# Patient Record
Sex: MALE | Race: WHITE | NOT HISPANIC OR LATINO | Employment: OTHER | URBAN - METROPOLITAN AREA
[De-identification: names, ages, dates, MRNs, and addresses within clinical notes are randomized per-mention and may not be internally consistent; named-entity substitution may affect disease eponyms.]

---

## 2017-02-15 ENCOUNTER — OFFICE VISIT (OUTPATIENT)
Dept: URGENT CARE | Facility: CLINIC | Age: 27
End: 2017-02-15

## 2017-02-15 PROCEDURE — 86580 TB INTRADERMAL TEST: CPT

## 2018-06-12 ENCOUNTER — OFFICE VISIT (OUTPATIENT)
Dept: URGENT CARE | Facility: CLINIC | Age: 28
End: 2018-06-12
Payer: COMMERCIAL

## 2018-06-12 VITALS
OXYGEN SATURATION: 100 % | TEMPERATURE: 97.5 F | RESPIRATION RATE: 18 BRPM | SYSTOLIC BLOOD PRESSURE: 104 MMHG | DIASTOLIC BLOOD PRESSURE: 78 MMHG | HEART RATE: 67 BPM

## 2018-06-12 DIAGNOSIS — J06.9 ACUTE URI: Primary | ICD-10-CM

## 2018-06-12 PROCEDURE — S9088 SERVICES PROVIDED IN URGENT: HCPCS | Performed by: PHYSICIAN ASSISTANT

## 2018-06-12 PROCEDURE — 99203 OFFICE O/P NEW LOW 30 MIN: CPT | Performed by: PHYSICIAN ASSISTANT

## 2018-06-12 NOTE — LETTER
June 12, 2018     Patient: Tracie Arechiga   YOB: 1990   Date of Visit: 6/12/2018       To Whom It May Concern: It is my medical opinion that Tracie Arechiga should not return to work until 6/14/18  If you have any questions or concerns, please don't hesitate to call           Sincerely,        Toshia Kendrick PA-C    CC: No Recipients

## 2018-06-12 NOTE — PROGRESS NOTES
[facility]    NAME: Tracie Arechiga is a 33 y o  male  : 1990    MRN: [id number]  DATE: 2018  TIME: 11:34 AM    Assessment and Plan   Acute URI [J06 9]  1  Acute URI         Patient Instructions     Patient Instructions   Infection appears viral   Recommend symptomatic treatment  Can take ibuprofen or tylenol as needed for pain or fever  Over the counter cough and cold medications to help with symptoms  Use salt water gargles for sore throat and throat lozenges  Cough drops as needed  Wash hands frequently to prevent the spread of infection  If not improving over the next 7-10 days, follow up with PCP  Symptoms may persist for 10-14 days  Chief Complaint     Chief Complaint   Patient presents with    Sinusitis    Cold Like Symptoms       History of Present Illness   50-year-old male here with complaint of cold symptoms and sinus pressure for the last day  Patient states he just was not feeling well and he was sent home from work  His work make some get a work note so he is here to be seen  He is taking over-the-counter antihistamines which are helping a little bit  Review of Systems   Review of Systems   Constitutional: Negative for activity change, appetite change, chills, diaphoresis, fatigue, fever and unexpected weight change  HENT: Positive for congestion, sinus pressure and sore throat  Negative for ear pain, hearing loss, sneezing and tinnitus  Eyes: Negative for photophobia, redness and visual disturbance  Respiratory: Positive for cough  Negative for apnea, chest tightness, shortness of breath, wheezing and stridor  Cardiovascular: Negative for chest pain, palpitations and leg swelling  Gastrointestinal: Negative for abdominal distention, abdominal pain, blood in stool, constipation, diarrhea, nausea and vomiting  Endocrine: Negative for cold intolerance, heat intolerance, polydipsia, polyphagia and polyuria     Genitourinary: Negative for difficulty urinating, dysuria, flank pain, hematuria and urgency  Musculoskeletal: Negative for arthralgias, back pain, gait problem, myalgias, neck pain and neck stiffness  Skin: Negative for rash and wound  Neurological: Negative for dizziness, tremors, seizures, syncope, weakness, light-headedness, numbness and headaches  Hematological: Negative for adenopathy  Does not bruise/bleed easily  Psychiatric/Behavioral: Negative for agitation, behavioral problems, confusion and decreased concentration  The patient is not nervous/anxious  All other systems reviewed and are negative  Current Medications   No current outpatient prescriptions on file  Current Allergies     Allergies as of 06/12/2018    (No Known Allergies)          The following portions of the patient's history were reviewed and updated as appropriate: allergies, current medications, past family history, past medical history, past social history, past surgical history and problem list    History reviewed  No pertinent past medical history  No past surgical history on file  No family history on file  Medications have been verified  Objective   /78   Pulse 67   Temp 97 5 °F (36 4 °C)   Resp 18   SpO2 100%      Physical Exam   Physical Exam   Constitutional: He appears well-developed and well-nourished  No distress  HENT:   Head: Normocephalic  Right Ear: Tympanic membrane and external ear normal    Left Ear: Tympanic membrane and external ear normal    Nose: Mucosal edema present  Mouth/Throat: Oropharynx is clear and moist  No oropharyngeal exudate  Neck: Normal range of motion  Neck supple  Cardiovascular: Normal rate, regular rhythm and normal heart sounds  No murmur heard  Pulmonary/Chest: Effort normal and breath sounds normal  No respiratory distress  He has no wheezes  He has no rales  Abdominal: Soft  Bowel sounds are normal  There is no tenderness  Musculoskeletal: Normal range of motion  Lymphadenopathy:     He has no cervical adenopathy  Skin: Skin is warm  No rash noted  Vitals reviewed

## 2018-08-16 ENCOUNTER — OFFICE VISIT (OUTPATIENT)
Dept: URGENT CARE | Facility: CLINIC | Age: 28
End: 2018-08-16
Payer: COMMERCIAL

## 2018-08-16 ENCOUNTER — APPOINTMENT (EMERGENCY)
Dept: RADIOLOGY | Facility: HOSPITAL | Age: 28
End: 2018-08-16
Payer: COMMERCIAL

## 2018-08-16 ENCOUNTER — HOSPITAL ENCOUNTER (EMERGENCY)
Facility: HOSPITAL | Age: 28
Discharge: HOME | End: 2018-08-16
Attending: EMERGENCY MEDICINE
Payer: COMMERCIAL

## 2018-08-16 VITALS
RESPIRATION RATE: 22 BRPM | DIASTOLIC BLOOD PRESSURE: 84 MMHG | HEART RATE: 108 BPM | OXYGEN SATURATION: 100 % | SYSTOLIC BLOOD PRESSURE: 153 MMHG | TEMPERATURE: 98.1 F

## 2018-08-16 VITALS
HEART RATE: 80 BPM | SYSTOLIC BLOOD PRESSURE: 167 MMHG | RESPIRATION RATE: 43 BRPM | OXYGEN SATURATION: 95 % | WEIGHT: 125 LBS | BODY MASS INDEX: 18.94 KG/M2 | DIASTOLIC BLOOD PRESSURE: 89 MMHG | HEIGHT: 68 IN

## 2018-08-16 DIAGNOSIS — R06.4 HYPERVENTILATION: ICD-10-CM

## 2018-08-16 DIAGNOSIS — F41.9 ANXIETY: Primary | ICD-10-CM

## 2018-08-16 DIAGNOSIS — R07.9 CHEST PAIN, UNSPECIFIED TYPE: Primary | ICD-10-CM

## 2018-08-16 DIAGNOSIS — R00.0 SINUS TACHYCARDIA: ICD-10-CM

## 2018-08-16 PROCEDURE — 93005 ELECTROCARDIOGRAM TRACING: CPT | Performed by: PHYSICIAN ASSISTANT

## 2018-08-16 PROCEDURE — 99285 EMERGENCY DEPT VISIT HI MDM: CPT

## 2018-08-16 PROCEDURE — 99213 OFFICE O/P EST LOW 20 MIN: CPT | Performed by: PHYSICIAN ASSISTANT

## 2018-08-16 PROCEDURE — 71046 X-RAY EXAM CHEST 2 VIEWS: CPT

## 2018-08-16 RX ORDER — LORAZEPAM 2 MG/ML
1 INJECTION INTRAMUSCULAR ONCE
Status: DISCONTINUED | OUTPATIENT
Start: 2018-08-16 | End: 2018-08-16 | Stop reason: HOSPADM

## 2018-08-16 NOTE — ED PROVIDER NOTES
History  Chief Complaint   Patient presents with    Chest Pain     Patient has a hx of anxiety, to day go into a verbal argument with his employer and started having substurnal chest hevieness, then sharp into his left chest       33 yr male with c/o onset shortness of breath and hyperventilation similar to his anxiety attacks in the past after an argument with his co-worker  No chest pain, nausea vomiting or diaphoresis  Patient states he had paresthesias in all extremities  No abdominal or back pain  Patient states that he has had anxiety since he was a child  No depression or suicidal ideations  Patient does not want to see crisis  Patient states he started to feel better now  History provided by:  Patient  Chest Pain   Associated symptoms: numbness and shortness of breath    Associated symptoms: no abdominal pain, no back pain, no cough, no fever, no nausea and not vomiting        None       Past Medical History:   Diagnosis Date    Psychiatric disorder     anxiety        History reviewed  No pertinent surgical history  History reviewed  No pertinent family history  I have reviewed and agree with the history as documented  Social History   Substance Use Topics    Smoking status: Never Smoker    Smokeless tobacco: Never Used    Alcohol use Yes      Comment: socialy        Review of Systems   Constitutional: Negative for fever  Respiratory: Positive for shortness of breath  Negative for cough  Cardiovascular: Positive for chest pain  Negative for leg swelling  Gastrointestinal: Negative for abdominal pain, nausea and vomiting  Musculoskeletal: Negative for back pain  Neurological: Positive for numbness  Psychiatric/Behavioral: Negative for suicidal ideas  Anxiety       Physical Exam  Physical Exam   Constitutional: He is oriented to person, place, and time  He appears well-developed and well-nourished     Anxious   HENT:   Nose: Nose normal    Mouth/Throat: Oropharynx is clear and moist  No oropharyngeal exudate  Eyes: Conjunctivae are normal  Pupils are equal, round, and reactive to light  No scleral icterus  Neck: Normal range of motion  Neck supple  No JVD present  No tracheal deviation present  Cardiovascular: Regular rhythm and normal heart sounds  No murmur heard  Tachycardic   Pulmonary/Chest: Effort normal and breath sounds normal  No respiratory distress  He has no wheezes  He has no rales  Abdominal: Soft  Bowel sounds are normal  There is no tenderness  There is no guarding  Musculoskeletal: Normal range of motion  He exhibits no edema or tenderness  No calf tenderness   Neurological: He is alert and oriented to person, place, and time  No cranial nerve deficit or sensory deficit  He exhibits normal muscle tone  5/5 motor, nl sens   Skin: Skin is warm and dry  Psychiatric: His behavior is normal    Anxious   Nursing note and vitals reviewed  Vital Signs  ED Triage Vitals [08/16/18 1338]   Temp Pulse Respirations Blood Pressure SpO2   -- (!) 123 (!) 24 167/89 100 %      Temp Source Heart Rate Source Patient Position - Orthostatic VS BP Location FiO2 (%)   Temporal Monitor Sitting Left arm --      Pain Score       No Pain           Vitals:    08/16/18 1338 08/16/18 1345 08/16/18 1724   BP: 167/89 167/89    Pulse: (!) 123 (!) 121 80   Patient Position - Orthostatic VS: Sitting         Visual Acuity      ED Medications  Medications   LORazepam (ATIVAN) 2 mg/mL injection 1 mg (not administered)       Diagnostic Studies  Results Reviewed     None                 XR chest 2 views   Final Result by Tatiana Hanson (08/16 1530)   No acute cardiopulmonary disease             Signed by Tatiana Hanson MD                 Procedures  Procedures       Phone Contacts  ED Phone Contact    ED Course  ED Course as of Aug 16 1934   Thu Aug 16, 2018   1635 Pt remains tachycardic - declined ativan   HR 80 at discharge - feels much better MDM  CritCare Time    Disposition  Final diagnoses:   Anxiety   Hyperventilation     Time reflects when diagnosis was documented in both MDM as applicable and the Disposition within this note     Time User Action Codes Description Comment    8/16/2018  5:31 PM Saloni Rodriguez Add [F41 9] Anxiety     8/16/2018  5:32 PM Anisha CARRINGTON Add [R06 4] Hyperventilation       ED Disposition     ED Disposition Condition Comment    Discharge  [place] discharge to home/self care  Condition at discharge: Stable        Follow-up Information     Follow up With Specialties Details Why Contact Info    Clarisa Duron DO Family Medicine Schedule an appointment as soon as possible for a visit in 1 day Return if you worsen or any new problems occur  [address] [facility]  [address]            There are no discharge medications for this patient  No discharge procedures on file      ED Provider  Electronically Signed by           Leanne Perez MD  08/16/18 [address] [facility] Timbo Doyle MD  08/16/18 0709

## 2018-08-16 NOTE — PROGRESS NOTES
[facility]        NAME: Margy Coleman is a 33 y o  male  : 1990    MRN: [id number]  DATE: 2018  TIME: 1:16 PM    Assessment and Plan   Chest pain, unspecified type [R07 9]  1  Chest pain, unspecified type  Transfer to other facility   2  Sinus tachycardia  Transfer to other facility         Patient Instructions       Follow up with PCP in 3-5 days  Proceed to  ER if symptoms worsen  Chief Complaint     Chief Complaint   Patient presents with    Chest Pain         History of Present Illness       Patient presents with substernal chest pain which started approximately an hour and half ago after complication at work  He has known anxiety and felt himself becoming anxious  ENT coming lightheaded feels like he was going to pass out walked into our facility with the same symptoms  He denies any radiation of the chest pain  Review of Systems   Review of Systems   Respiratory: Positive for shortness of breath  Cardiovascular: Positive for chest pain  Neurological: Positive for light-headedness  Hematological: Negative for adenopathy  Current Medications     No current outpatient prescriptions on file  Current Allergies     Allergies as of 2018    (No Known Allergies)            The following portions of the patient's history were reviewed and updated as appropriate: allergies, current medications, past family history, past medical history, past social history, past surgical history and problem list      History reviewed  No pertinent past medical history  History reviewed  No pertinent surgical history  No family history on file  Medications have been verified  Objective   /84   Pulse (!) 108   Temp 98 1 °F (36 7 °C)   Resp 22   SpO2 100%        Physical Exam     Physical Exam   Constitutional: He is oriented to person, place, and time  Patient appears anxious with tachypnea  HENT:   Head: Normocephalic and atraumatic  Mouth/Throat: Oropharynx is clear and moist    Eyes: Conjunctivae are normal    Neck: Neck supple  No tracheal deviation present  Cardiovascular: Regular rhythm and normal heart sounds  Tachycardia present  Pulmonary/Chest: Breath sounds normal  Tachypnea noted  Musculoskeletal: Normal range of motion  Neurological: He is alert and oriented to person, place, and time  Skin: Skin is warm and dry  No rash noted  Psychiatric: He has a normal mood and affect  His behavior is normal  Judgment and thought content normal    Nursing note and vitals reviewed  EKG revealed sinus tachycardia  Initial vitals vital signs on presentation was a heart rate of 160 bpm pulse oximetry of 97% on room air; blood pressure 160/108  EMS was called and patient was transported to the emergency room in stable condition  Vital size improved however patient still complained of chest pain as anxiety was resolving

## 2018-08-16 NOTE — DISCHARGE INSTRUCTIONS
Anxiety   WHAT YOU NEED TO KNOW:   Anxiety is a condition that causes you to feel extremely worried or nervous  The feelings are so strong that they can cause problems with your daily activities or sleep  Anxiety may be triggered by something you fear, or it may happen without a cause  Family or work stress, smoking, caffeine, and alcohol can increase your risk for anxiety  Certain medicines or health conditions can also increase your risk  Anxiety can become a long-term condition if it is not managed or treated  DISCHARGE INSTRUCTIONS:   Call 911 if:   · You have chest pain, tightness, or heaviness that may spread to your shoulders, arms, jaw, neck, or back  · You feel like hurting yourself or someone else  Contact your healthcare provider if:   · Your symptoms get worse or do not get better with treatment  · Your anxiety keeps you from doing your regular daily activities  · You have new symptoms since your last visit  · You have questions or concerns about your condition or care  Medicines:   · Medicines  may be given to help you feel more calm and relaxed, and decrease your symptoms  · Take your medicine as directed  Contact your healthcare provider if you think your medicine is not helping or if you have side effects  Tell him of her if you are allergic to any medicine  Keep a list of the medicines, vitamins, and herbs you take  Include the amounts, and when and why you take them  Bring the list or the pill bottles to follow-up visits  Carry your medicine list with you in case of an emergency  Follow up with your healthcare provider within 2 weeks or as directed:  Write down your questions so you remember to ask them during your visits  Manage anxiety:   · Talk to someone about your anxiety  Your healthcare provider may suggest counseling  Cognitive behavioral therapy can help you understand and change how you react to events that trigger your symptoms   You might feel more comfortable talking with a friend or family member about your anxiety  Choose someone you know will be supportive and encouraging  · Find ways to relax  Activities such as exercise, meditation, or listening to music can help you relax  Spend time with friends, or do things you enjoy  · Practice deep breathing  Deep breathing can help you relax when you feel anxious  Focus on taking slow, deep breaths several times a day, or during an anxiety attack  Breathe in through your nose and out through your mouth  · Create a regular sleep routine  Regular sleep can help you feel calmer during the day  Go to sleep and wake up at the same times every day  Do not watch television or use the computer right before bed  Your room should be comfortable, dark, and quiet  · Eat a variety of healthy foods  Healthy foods include fruits, vegetables, low-fat dairy products, lean meats, fish, whole-grain breads, and cooked beans  Healthy foods can help you feel less anxious and have more energy  · Exercise regularly  Exercise can increase your energy level  Exercise may also lift your mood and help you sleep better  Your healthcare provider can help you create an exercise plan  · Do not smoke  Nicotine and other chemicals in cigarettes and cigars can increase anxiety  Ask your healthcare provider for information if you currently smoke and need help to quit  E-cigarettes or smokeless tobacco still contain nicotine  Talk to your healthcare provider before you use these products  · Do not have caffeine  Caffeine can make your symptoms worse  Do not have foods or drinks that are meant to increase your energy level  · Limit or do not drink alcohol  Ask your healthcare provider if alcohol is safe for you  You may not be able to drink alcohol if you take certain anxiety or depression medicines  Limit alcohol to 1 drink per day if you are a woman  Limit alcohol to 2 drinks per day if you are a man   A drink of alcohol is 12 ounces of beer, 5 ounces of wine, or 1½ ounces of liquor  · Do not use drugs  Drugs can make your anxiety worse  It can also make anxiety hard to manage  Talk to your healthcare provider if you use drugs and want help to quit  © 2017 [employer] Information is for End User's use only and may not be sold, redistributed or otherwise used for commercial purposes  All illustrations and images included in CareNotes® are the copyrighted property of [employer]  or [employer]  The above information is an  only  It is not intended as medical advice for individual conditions or treatments  Talk to your doctor, nurse or pharmacist before following any medical regimen to see if it is safe and effective for you  Hyperventilation   WHAT YOU NEED TO KNOW:   Hyperventilation is when your breathing is faster than normal  This fast breathing may cause you to also have other symptoms  Hyperventilation may be caused by anxiety, stress, or panic  Other causes include medicines, imbalances in the chemicals in your body, and too much caffeine  DISCHARGE INSTRUCTIONS:   Medicines:   · Antianxiety medicine: This medicine may be given to decrease anxiety and help you feel calm and relaxed  · Take your medicine as directed  Contact your healthcare provider if you think your medicine is not helping or if you have side effects  Tell him of her if you are allergic to any medicine  Keep a list of the medicines, vitamins, and herbs you take  Include the amounts, and when and why you take them  Bring the list or the pill bottles to follow-up visits  Carry your medicine list with you in case of an emergency  Follow up with your healthcare provider as directed:  Write down your questions so you remember to ask them during your visits  Manage hyperventilation:   · Stress management:  Learn about the stressors that cause you to hyperventilate   Find other ways to manage your response to stress  Deep breathing, relaxing muscles, or meditation may help you  Do not use the home remedy of breathing into a paper bag  This can be very dangerous, because it may cause a lack of oxygen  · Counseling: You may need help to manage the stress or anxiety that is causing your hyperventilation  Your healthcare provider may suggest that you see a counselor to talk about how you are feeling  Contact your healthcare provider if:   · Your symptoms do not get better, or they get worse  · You have questions or concerns about your condition or care  Return to the emergency department if:   · You have a seizure  · You feel like you are going to faint  · You have chest pain  © 2017 [facility] Information is for End User's use only and may not be sold, redistributed or otherwise used for commercial purposes  All illustrations and images included in CareNotes® are the copyrighted property of [employer]  or [employer]  The above information is an  only  It is not intended as medical advice for individual conditions or treatments  Talk to your doctor, nurse or pharmacist before following any medical regimen to see if it is safe and effective for you

## 2018-08-17 LAB
ATRIAL RATE: 100 BPM
P AXIS: 75 DEGREES
PR INTERVAL: 126 MS
QRS AXIS: 84 DEGREES
QRSD INTERVAL: 92 MS
QT INTERVAL: 332 MS
QTC INTERVAL: 428 MS
T WAVE AXIS: 37 DEGREES
VENTRICULAR RATE: 100 BPM

## 2018-08-17 PROCEDURE — 93010 ELECTROCARDIOGRAM REPORT: CPT | Performed by: INTERNAL MEDICINE

## 2018-08-20 ENCOUNTER — OFFICE VISIT (OUTPATIENT)
Dept: URGENT CARE | Facility: CLINIC | Age: 28
End: 2018-08-20
Payer: COMMERCIAL

## 2018-08-20 VITALS
HEART RATE: 117 BPM | OXYGEN SATURATION: 98 % | BODY MASS INDEX: 19.67 KG/M2 | WEIGHT: 129.8 LBS | SYSTOLIC BLOOD PRESSURE: 144 MMHG | DIASTOLIC BLOOD PRESSURE: 84 MMHG | RESPIRATION RATE: 28 BRPM | TEMPERATURE: 98.6 F | HEIGHT: 68 IN

## 2018-08-20 DIAGNOSIS — F41.9 ANXIETY: Primary | ICD-10-CM

## 2018-08-20 LAB
ATRIAL RATE: 103 BPM
P AXIS: 75 DEGREES
PR INTERVAL: 126 MS
QRS AXIS: 85 DEGREES
QRSD INTERVAL: 92 MS
QT INTERVAL: 342 MS
QTC INTERVAL: 448 MS
T WAVE AXIS: 26 DEGREES
VENTRICULAR RATE: 103 BPM

## 2018-08-20 PROCEDURE — 99213 OFFICE O/P EST LOW 20 MIN: CPT | Performed by: PHYSICIAN ASSISTANT

## 2018-08-20 PROCEDURE — 93005 ELECTROCARDIOGRAM TRACING: CPT | Performed by: PHYSICIAN ASSISTANT

## 2018-08-20 PROCEDURE — 93010 ELECTROCARDIOGRAM REPORT: CPT | Performed by: INTERNAL MEDICINE

## 2018-08-20 NOTE — LETTER
August 20, 2018     Patient: Daniella Srivastava   YOB: 1990   Date of Visit: 8/20/2018       To Whom It May Concern: It is my medical opinion that Daniella Srivastava may return to work on 8/22/18  If you have any questions or concerns, please don't hesitate to call           Sincerely,        Miguel Poole PA-C    CC: No Recipients

## 2018-08-20 NOTE — PROGRESS NOTES
[facility]        NAME: Jessica Farnsworth is a 33 y o  male  : 1990    MRN: [id number]  DATE: 2018  TIME: 8:50 AM    Assessment and Plan   Anxiety [F41 9]  1  Anxiety           Patient Instructions     1  Anxiety  -EKG was performed and shows normal sinus rhythm  Some peaked T waves however no acute ST segment elevation  -Patient refused any medications at this time  -You need to call your PCP for follow-up and management of your symptoms    Go to ER with worsening symptoms, chest pain, shortness of breath, fever, numbness, weakness, thoughts of harming yourself or others or any new concerns    Chief Complaint     Chief Complaint   Patient presents with    Anxiety     having a panic attack started between 7-730 am today  History of Present Illness       Patient is a 81-year-old male who presents today for evaluation of anxiety  Patient states that on Thursday, he got into a verbal altercation with a co-worker and ended up having a panic attack with acute chest pain and shortness of breath  Patient states that he went to [facility] in [place] and was then taken to the emergency department  Patient states that he was told that he was having an anxiety attack however he refused any medications  Patient states that over the weekend he still continued to be upset about what happened  Patient states that on his way to work this morning, he again started to have a panic attack and drove himself home  The patient states that he was brought here by a family member  Patient states that overall he is feeling improved since being here  He admits to some slight chest tightness and shortness of breath  Patient is refusing any medications at this time  Patient denies any suicidal or homicidal ideations  Review of Systems   Review of Systems   Constitutional: Negative for chills and fever  Respiratory: Positive for chest tightness and shortness of breath      Cardiovascular: Negative for chest pain  Gastrointestinal: Negative for abdominal pain  Neurological: Negative for dizziness, weakness, numbness and headaches  Psychiatric/Behavioral: Negative for suicidal ideas  The patient is nervous/anxious  Current Medications     No current outpatient prescriptions on file  Current Allergies     Allergies as of 08/20/2018    (No Known Allergies)            The following portions of the patient's history were reviewed and updated as appropriate: allergies, current medications, past family history, past medical history, past social history, past surgical history and problem list      Past Medical History:   Diagnosis Date    Anxiety     Psychiatric disorder     anxiety        History reviewed  No pertinent surgical history  Family History   Problem Relation Age of Onset    Gestational diabetes Mother          Medications have been verified  Objective   /84 (BP Location: Left arm, Patient Position: Sitting)   Pulse (!) 117   Temp 98 6 °F (37 °C) (Temporal)   Resp (!) 28   Ht 5' 8" (1 727 m)   Wt 58 9 kg (129 lb 12 8 oz)   SpO2 98%   BMI 19 74 kg/m²        Physical Exam     Physical Exam   Constitutional: He is oriented to person, place, and time  He appears well-developed and well-nourished  No distress  Cardiovascular: Regular rhythm  Tachycardia present  Pulmonary/Chest: Breath sounds normal  Tachypnea noted  Neurological: He is alert and oriented to person, place, and time  Skin: Skin is warm and dry  Psychiatric: His behavior is normal  Judgment and thought content normal  His mood appears anxious  Nursing note and vitals reviewed

## 2018-08-20 NOTE — PATIENT INSTRUCTIONS
1  Anxiety  -EKG was performed   -Patient refused any medications at this time  -You need to call your PCP for follow-up and management of your symptoms    Go to ER with worsening symptoms, chest pain, shortness of breath, fever, numbness, weakness, thoughts of harming yourself or others or any new concerns    Anxiety   AMBULATORY CARE:   Anxiety  is a condition that causes you to feel extremely worried or nervous  The feelings are so strong that they can cause problems with your daily activities or sleep  Anxiety may be triggered by something you fear, or it may happen without a cause  Family or work stress, smoking, caffeine, and alcohol can increase your risk for anxiety  Certain medicines or health conditions can also increase your risk  Anxiety can become a long-term condition if it is not managed or treated  Common signs and symptoms that may occur with anxiety:   · Fatigue or muscle tightness     · Shaking, restlessness, or irritability     · Problems focusing     · Trouble sleeping     · Feeling jumpy, easily startled, or dizzy     · Rapid heartbeat or shortness of breath  Call 911 if:   · You have chest pain, tightness, or heaviness that may spread to your shoulders, arms, jaw, neck, or back  · You feel like hurting yourself or someone else  Contact your healthcare provider if:   · Your symptoms get worse or do not get better with treatment  · You think your medicine may be causing side effects  · Your anxiety keeps you from doing your regular daily activities  · You have new symptoms since your last visit  · You have questions or concerns about your condition or care  Treatment for anxiety  may include medicines to help you feel calm and relaxed, and decrease your symptoms  Medicines are usually given together with therapy or other treatments  Manage anxiety:   · Talk to someone about your anxiety  Your healthcare provider may suggest counseling   Cognitive behavioral therapy can help you understand and change how you react to events that trigger your symptoms  You might feel more comfortable talking with a friend or family member about your anxiety  Choose someone you know will be supportive and encouraging  · Find ways to relax  Activities such as exercise, meditation, or listening to music can help you relax  Spend time with friends, or do things you enjoy  · Practice deep breathing  Deep breathing can help you relax when you feel anxious  Focus on taking slow, deep breaths several times a day, or during an anxiety attack  Breathe in through your nose and out through your mouth  · Create a regular sleep routine  Regular sleep can help you feel calmer during the day  Go to sleep and wake up at the same times every day  Do not watch television or use the computer right before bed  Your room should be comfortable, dark, and quiet  · Eat a variety of healthy foods  Healthy foods include fruits, vegetables, low-fat dairy products, lean meats, fish, whole-grain breads, and cooked beans  Healthy foods can help you feel less anxious and have more energy  · Exercise regularly  Exercise can increase your energy level  Exercise may also lift your mood and help you sleep better  Your healthcare provider can help you create an exercise plan  · Do not smoke  Nicotine and other chemicals in cigarettes and cigars can increase anxiety  Ask your healthcare provider for information if you currently smoke and need help to quit  E-cigarettes or smokeless tobacco still contain nicotine  Talk to your healthcare provider before you use these products  · Do not have caffeine  Caffeine can make your symptoms worse  Do not have foods or drinks that are meant to increase your energy level  · Limit or do not drink alcohol  Ask your healthcare provider if alcohol is safe for you  You may not be able to drink alcohol if you take certain anxiety or depression medicines   Limit alcohol to 1 drink per day if you are a woman  Limit alcohol to 2 drinks per day if you are a man  A drink of alcohol is 12 ounces of beer, 5 ounces of wine, or 1½ ounces of liquor  · Do not use drugs  Drugs can make your anxiety worse  It can also make anxiety hard to manage  Talk to your healthcare provider if you use drugs and want help to quit  Follow up with your healthcare provider as directed:  Write down your questions so you remember to ask them during your visits  © 2017 [employer] Information is for End User's use only and may not be sold, redistributed or otherwise used for commercial purposes  All illustrations and images included in CareNotes® are the copyrighted property of [employer]  or [facility]  The above information is an  only  It is not intended as medical advice for individual conditions or treatments  Talk to your doctor, nurse or pharmacist before following any medical regimen to see if it is safe and effective for you

## 2018-08-21 LAB
ATRIAL RATE: 77 BPM
ATRIAL RATE: 81 BPM
ATRIAL RATE: 83 BPM
P AXIS: 65 DEGREES
P AXIS: 66 DEGREES
P AXIS: 70 DEGREES
PR INTERVAL: 128 MS
QRS AXIS: 81 DEGREES
QRS AXIS: 82 DEGREES
QRS AXIS: 84 DEGREES
QRSD INTERVAL: 90 MS
QRSD INTERVAL: 92 MS
QRSD INTERVAL: 94 MS
QT INTERVAL: 374 MS
QT INTERVAL: 374 MS
QT INTERVAL: 376 MS
QTC INTERVAL: 425 MS
QTC INTERVAL: 434 MS
QTC INTERVAL: 439 MS
T WAVE AXIS: 46 DEGREES
T WAVE AXIS: 53 DEGREES
T WAVE AXIS: 54 DEGREES
VENTRICULAR RATE: 77 BPM
VENTRICULAR RATE: 81 BPM
VENTRICULAR RATE: 83 BPM

## 2018-08-21 PROCEDURE — 93010 ELECTROCARDIOGRAM REPORT: CPT | Performed by: INTERNAL MEDICINE

## 2021-06-07 ENCOUNTER — TRANSCRIBE ORDERS (OUTPATIENT)
Dept: ADMINISTRATIVE | Facility: HOSPITAL | Age: 31
End: 2021-06-07

## 2021-06-07 DIAGNOSIS — E04.1 NONTOXIC UNINODULAR GOITER: Primary | ICD-10-CM

## 2021-12-22 ENCOUNTER — HOSPITAL ENCOUNTER (OUTPATIENT)
Dept: RADIOLOGY | Facility: MEDICAL CENTER | Age: 31
Discharge: HOME | End: 2021-12-22
Payer: COMMERCIAL

## 2021-12-22 DIAGNOSIS — E04.1 NONTOXIC UNINODULAR GOITER: ICD-10-CM

## 2021-12-22 PROCEDURE — 76536 US EXAM OF HEAD AND NECK: CPT

## 2022-05-09 ENCOUNTER — APPOINTMENT (OUTPATIENT)
Dept: LAB | Facility: CLINIC | Age: 32
End: 2022-05-09
Payer: COMMERCIAL

## 2022-05-09 DIAGNOSIS — R04.0 EPISTAXIS: ICD-10-CM

## 2022-05-09 LAB
APTT PPP: 31 SECONDS (ref 23–37)
BASOPHILS # BLD AUTO: 0.05 THOUSANDS/ΜL (ref 0–0.1)
BASOPHILS NFR BLD AUTO: 1 % (ref 0–1)
EOSINOPHIL # BLD AUTO: 0.35 THOUSAND/ΜL (ref 0–0.61)
EOSINOPHIL NFR BLD AUTO: 5 % (ref 0–6)
ERYTHROCYTE [DISTWIDTH] IN BLOOD BY AUTOMATED COUNT: 13.2 % (ref 11.6–15.1)
HCT VFR BLD AUTO: 45.2 % (ref 36.5–49.3)
HGB BLD-MCNC: 14.4 G/DL (ref 12–17)
IMM GRANULOCYTES # BLD AUTO: 0.02 THOUSAND/UL (ref 0–0.2)
IMM GRANULOCYTES NFR BLD AUTO: 0 % (ref 0–2)
INR PPP: 0.93 (ref 0.84–1.19)
LYMPHOCYTES # BLD AUTO: 2.41 THOUSANDS/ΜL (ref 0.6–4.47)
LYMPHOCYTES NFR BLD AUTO: 32 % (ref 14–44)
MCH RBC QN AUTO: 30.1 PG (ref 26.8–34.3)
MCHC RBC AUTO-ENTMCNC: 31.9 G/DL (ref 31.4–37.4)
MCV RBC AUTO: 95 FL (ref 82–98)
MONOCYTES # BLD AUTO: 0.71 THOUSAND/ΜL (ref 0.17–1.22)
MONOCYTES NFR BLD AUTO: 10 % (ref 4–12)
NEUTROPHILS # BLD AUTO: 3.93 THOUSANDS/ΜL (ref 1.85–7.62)
NEUTS SEG NFR BLD AUTO: 52 % (ref 43–75)
NRBC BLD AUTO-RTO: 0 /100 WBCS
PLATELET # BLD AUTO: 276 THOUSANDS/UL (ref 149–390)
PMV BLD AUTO: 11.1 FL (ref 8.9–12.7)
PROTHROMBIN TIME: 12.1 SECONDS (ref 11.6–14.5)
RBC # BLD AUTO: 4.78 MILLION/UL (ref 3.88–5.62)
WBC # BLD AUTO: 7.47 THOUSAND/UL (ref 4.31–10.16)

## 2022-05-09 PROCEDURE — 85025 COMPLETE CBC W/AUTO DIFF WBC: CPT

## 2022-05-09 PROCEDURE — 36415 COLL VENOUS BLD VENIPUNCTURE: CPT

## 2022-05-09 PROCEDURE — 85730 THROMBOPLASTIN TIME PARTIAL: CPT

## 2022-05-09 PROCEDURE — 85610 PROTHROMBIN TIME: CPT

## 2024-06-27 DIAGNOSIS — Z00.6 ENCOUNTER FOR EXAMINATION FOR NORMAL COMPARISON OR CONTROL IN CLINICAL RESEARCH PROGRAM: ICD-10-CM
